# Patient Record
Sex: FEMALE | Race: BLACK OR AFRICAN AMERICAN
[De-identification: names, ages, dates, MRNs, and addresses within clinical notes are randomized per-mention and may not be internally consistent; named-entity substitution may affect disease eponyms.]

---

## 2018-04-15 ENCOUNTER — HOSPITAL ENCOUNTER (OUTPATIENT)
Dept: HOSPITAL 92 - ERS | Age: 28
Setting detail: OBSERVATION
LOS: 1 days | Discharge: HOME | End: 2018-04-16
Attending: SURGERY | Admitting: SURGERY
Payer: SELF-PAY

## 2018-04-15 VITALS — BODY MASS INDEX: 37.8 KG/M2

## 2018-04-15 DIAGNOSIS — F41.9: ICD-10-CM

## 2018-04-15 DIAGNOSIS — F32.9: ICD-10-CM

## 2018-04-15 DIAGNOSIS — K80.64: Primary | ICD-10-CM

## 2018-04-15 DIAGNOSIS — Z91.5: ICD-10-CM

## 2018-04-15 DIAGNOSIS — Z79.899: ICD-10-CM

## 2018-04-15 LAB
ALBUMIN SERPL BCG-MCNC: 4.3 G/DL (ref 3.5–5)
ALP SERPL-CCNC: 131 U/L (ref 40–150)
ALT SERPL W P-5'-P-CCNC: 345 U/L (ref 8–55)
ANION GAP SERPL CALC-SCNC: 14 MMOL/L (ref 10–20)
AST SERPL-CCNC: 496 U/L (ref 5–34)
BACTERIA UR QL AUTO: (no result) HPF
BASOPHILS # BLD AUTO: 0 THOU/UL (ref 0–0.2)
BASOPHILS NFR BLD AUTO: 0.2 % (ref 0–1)
BILIRUB SERPL-MCNC: 2.7 MG/DL (ref 0.2–1.2)
BUN SERPL-MCNC: 8 MG/DL (ref 7–18.7)
CALCIUM SERPL-MCNC: 9.3 MG/DL (ref 7.8–10.44)
CHLORIDE SERPL-SCNC: 105 MMOL/L (ref 98–107)
CO2 SERPL-SCNC: 23 MMOL/L (ref 22–29)
CREAT CL PREDICTED SERPL C-G-VRATE: 0 ML/MIN (ref 70–130)
CRYSTAL-AUWI FLAG: 0.1 (ref 0–15)
EOSINOPHIL # BLD AUTO: 0.2 THOU/UL (ref 0–0.7)
EOSINOPHIL NFR BLD AUTO: 1.8 % (ref 0–10)
GLOBULIN SER CALC-MCNC: 3.2 G/DL (ref 2.4–3.5)
GLUCOSE SERPL-MCNC: 113 MG/DL (ref 70–105)
HEV IGM SER QL: 8.1 (ref 0–7.99)
HGB BLD-MCNC: 14.3 G/DL (ref 12–16)
HYALINE CASTS #/AREA URNS LPF: (no result) LPF
LIPASE SERPL-CCNC: 26 U/L (ref 8–78)
LYMPHOCYTES # BLD: 1.4 THOU/UL (ref 1.2–3.4)
LYMPHOCYTES NFR BLD AUTO: 14.8 % (ref 21–51)
MCH RBC QN AUTO: 30.5 PG (ref 27–31)
MCV RBC AUTO: 91.7 FL (ref 81–99)
MONOCYTES # BLD AUTO: 0.7 THOU/UL (ref 0.11–0.59)
MONOCYTES NFR BLD AUTO: 6.7 % (ref 0–10)
NEUTROPHILS # BLD AUTO: 7.4 THOU/UL (ref 1.4–6.5)
NEUTROPHILS NFR BLD AUTO: 76.5 % (ref 42–75)
PATHC CAST-AUWI FLAG: 1.01 (ref 0–2.49)
PLATELET # BLD AUTO: 264 THOU/UL (ref 130–400)
POTASSIUM SERPL-SCNC: 3.6 MMOL/L (ref 3.5–5.1)
PREGS CONTROL BACKGROUND?: (no result)
PREGS CONTROL BAR APPEAR?: YES
RBC # BLD AUTO: 4.7 MILL/UL (ref 4.2–5.4)
SODIUM SERPL-SCNC: 138 MMOL/L (ref 136–145)
SP GR UR STRIP: 1.02 (ref 1–1.04)
SPERM-AUWI FLAG: 0 (ref 0–9.9)
WBC # BLD AUTO: 9.7 THOU/UL (ref 4.8–10.8)
YEAST-AUWI FLAG: 0 (ref 0–25)

## 2018-04-15 PROCEDURE — 93005 ELECTROCARDIOGRAM TRACING: CPT

## 2018-04-15 PROCEDURE — 76705 ECHO EXAM OF ABDOMEN: CPT

## 2018-04-15 PROCEDURE — 81003 URINALYSIS AUTO W/O SCOPE: CPT

## 2018-04-15 PROCEDURE — 80053 COMPREHEN METABOLIC PANEL: CPT

## 2018-04-15 PROCEDURE — BF101ZZ FLUOROSCOPY OF BILE DUCTS USING LOW OSMOLAR CONTRAST: ICD-10-PCS | Performed by: SURGERY

## 2018-04-15 PROCEDURE — 36415 COLL VENOUS BLD VENIPUNCTURE: CPT

## 2018-04-15 PROCEDURE — 0FT44ZZ RESECTION OF GALLBLADDER, PERCUTANEOUS ENDOSCOPIC APPROACH: ICD-10-PCS | Performed by: SURGERY

## 2018-04-15 PROCEDURE — 47532 INJECTION FOR CHOLANGIOGRAM: CPT

## 2018-04-15 PROCEDURE — 88304 TISSUE EXAM BY PATHOLOGIST: CPT

## 2018-04-15 PROCEDURE — 83690 ASSAY OF LIPASE: CPT

## 2018-04-15 PROCEDURE — 84703 CHORIONIC GONADOTROPIN ASSAY: CPT

## 2018-04-15 PROCEDURE — 81015 MICROSCOPIC EXAM OF URINE: CPT

## 2018-04-15 PROCEDURE — 85025 COMPLETE CBC W/AUTO DIFF WBC: CPT

## 2018-04-15 PROCEDURE — 74330 X-RAY BILE/PANC ENDOSCOPY: CPT

## 2018-04-15 PROCEDURE — G0378 HOSPITAL OBSERVATION PER HR: HCPCS

## 2018-04-15 RX ADMIN — HYDROCODONE BITARTRATE AND ACETAMINOPHEN PRN TAB: 5; 325 TABLET ORAL at 22:58

## 2018-04-15 NOTE — ULT
RIGHT UPPER QUADRANT PAIN:

 

HISTORY: 

Nausea.

 

DATE: 4/15/18.

 

FINDINGS: 

Multiple longitudinal and transverse images of the right upper quadrant of the abdomen are obtained u
sing a multihertz curvilinear transducer.  Real-time and color images are used to evaluate the right 
upper quadrant. 

 

The liver is unremarkable.  The abdominal aorta and inferior vena cava are unremarkable.

 

The gallbladder contains numerous echogenic and shadowing foci compatible with numerous gallstones.  
There may also be a possible gallbladder polyp present.  No evidence of gallbladder wall thickening s
een.

 

The common bile duct is dilated measuring up to 9 mm.  This may represent a distal common bile duct c
alculus.  

 

Visualized portion of the pancreas is unremarkable.  The right kidney is unremarkable.

 

IMPRESSION: 

Cholelithiasis with possible choledocholithiasis.  No significant evidence of ascites is seen.

 

POS: [facility]

## 2018-04-15 NOTE — HP
CHIEF COMPLAINT:  Abdominal pain.

 

HISTORY OF PRESENT ILLNESS:  Ms. Landis is a 27-year-old woman, who presented to the emergency room wi
 unrelenting epigastric pain radiating to her back.  She had a previous episode in January, but thi
s resolved on its own.  The current episode woke her up at 3:00 a.m. and has been persistent since 
at time.  She has had nausea, but no vomiting.  No jaundice or icterus.  The pain is made worse by mo
ving around.  Evaluation in the Emergency Room was positive for elevated LFTs and a dilated common bi
le duct with gallstones in the gallbladder.  The patient has received pain medication in the Emergenc
y Room and is now feeling better.

 

PAST MEDICAL HISTORY:  None.

 

PAST SURGICAL HISTORY:  None.

 

PSYCHIATRIC HISTORY:  History of depression and anxiety with a suicidal gesture in .

 

SOCIAL HISTORY:  She does not smoke, drink or use illicit drugs.

 

FAMILY HISTORY:  Diabetes and hypertension.  Her aunt had heart problems and  after gallbladder s
urgery as a result of her heart disease.

 

ALLERGIES:  The patient has no known drug allergies.

 

OUTPATIENT MEDICATIONS:  Include Prozac daily.

 

REVIEW OF SYSTEMS:  Ten system review of systems is negative except per HPI.

 

PHYSICAL EXAMINATION:

VITAL SIGNS:  Temperature 98.4, heart rate 100, blood pressure 118/68, respirations 20, 97% saturated
 on room air.

GENERAL:  Reveals a slightly anxious appearing young woman, in no acute distress.  She is not flushed
 or toxic in appearance.  She is not jaundiced or icteric.

HEENT:  Unremarkable.

NECK:  Supple, without lymphadenopathy or thyroid nodules.

HEART:  Regular in its rate and rhythm without murmurs, rubs or gallops.

LUNGS:  Clear to auscultation bilaterally with good air entry.  She does not have any pain with deep 
inspiration.

ABDOMEN:  Soft, nontender and nondistended.  She states that previously she was tender to palpation i
n the epigastrium and right upper quadrant, but that has gone away.

EXTREMITIES:  Warm and well perfused without edema.

NEUROLOGIC:  No focal deficits.

PSYCHIATRIC:  Alert, oriented, and appropriate.

 

LABORATORY AND X-RAY FINDINGS:  White count is normal.  Serum pregnancy is negative.  Electrolytes ar
e unremarkable, but bilirubin is elevated at 2.7 and AST and ALT are elevated at 496 and 345.  Ultras
ound images are reviewed and I agree with the written report.  She has stones in her gallbladder, but
 no wall thickening, sonographic Renteria sign or pericholecystic fluid to suggest acute cholecystitis.
  Her bile duct is dilated at 9 mm.

 

ASSESSMENT AND PLAN:  Cholelithiasis and possible choledocholithiasis.  The patient's pain has resolv
ed and she may have passed her stone.  I have discussed her case with Dr. Maya and he recommends lapa
roscopic cholecystectomy first with cholangiogram and is available for immediate endoscopic retrograd
e cholangiopancreatography if necessary.  I discussed the diagnosis and treatment plan with the patie
nt and her family.  I explained the procedures of laparoscopic cholecystectomy with cholangiogram and
 also of endoscopic retrograde cholangiopancreatography and their inherent risks.  Risks of gallbladd
er surgery include but are not limited to bleeding; infection; risks of anesthesia; damage to nearby 
structures including bowel, liver and bile duct; need for open surgery; need for further procedures. 
 Risk of endoscopic retrograde cholangiopancreatography include bleeding, infection, perforation, nee
d further procedures and pancreatitis.  The patient and her family understand and accept these risks 
and wish to proceed.  All of their questions answered.  Antibiotics will be ordered on call to the OR
.

## 2018-04-15 NOTE — RAD
CHOLANGIOGRAM IN SURGERY:

 

Comparison: Gallbladder ultrasound 4-15-18. 

 

History: Cholelithiasis. 

 

FINDINGS/IMPRESSION: 

Two limited intraoperative fluoroscopic views from a cholangiogram in surgery were performed. Contras
t was seen in the cystic duct and common bile duct. There is enlargement of the common bile duct with
 central intrahepatic biliary dilatation. There is no definite spillage of contrast into the duodenum
 and a stone in the distal common bile duct cannot be excluded. No obvious filling defect is seen wit
hin the opacified portions of the common bile duct. 

 

POS: [facility]

## 2018-04-16 VITALS — DIASTOLIC BLOOD PRESSURE: 65 MMHG | SYSTOLIC BLOOD PRESSURE: 95 MMHG | TEMPERATURE: 98.2 F

## 2018-04-16 LAB
ALBUMIN SERPL BCG-MCNC: 3.9 G/DL (ref 3.5–5)
ALP SERPL-CCNC: 150 U/L (ref 40–150)
ALT SERPL W P-5'-P-CCNC: 317 U/L (ref 8–55)
ANION GAP SERPL CALC-SCNC: 11 MMOL/L (ref 10–20)
AST SERPL-CCNC: 231 U/L (ref 5–34)
BASOPHILS # BLD AUTO: 0 THOU/UL (ref 0–0.2)
BASOPHILS NFR BLD AUTO: 0 % (ref 0–1)
BILIRUB SERPL-MCNC: 0.9 MG/DL (ref 0.2–1.2)
BUN SERPL-MCNC: 5 MG/DL (ref 7–18.7)
CALCIUM SERPL-MCNC: 8.7 MG/DL (ref 7.8–10.44)
CHLORIDE SERPL-SCNC: 106 MMOL/L (ref 98–107)
CO2 SERPL-SCNC: 27 MMOL/L (ref 22–29)
CREAT CL PREDICTED SERPL C-G-VRATE: 172 ML/MIN (ref 70–130)
EOSINOPHIL # BLD AUTO: 0 THOU/UL (ref 0–0.7)
EOSINOPHIL NFR BLD AUTO: 0.1 % (ref 0–10)
GLOBULIN SER CALC-MCNC: 2.9 G/DL (ref 2.4–3.5)
GLUCOSE SERPL-MCNC: 148 MG/DL (ref 70–105)
HGB BLD-MCNC: 13.4 G/DL (ref 12–16)
LIPASE SERPL-CCNC: 13 U/L (ref 8–78)
LYMPHOCYTES # BLD: 0.9 THOU/UL (ref 1.2–3.4)
LYMPHOCYTES NFR BLD AUTO: 8.2 % (ref 21–51)
MCH RBC QN AUTO: 30.8 PG (ref 27–31)
MCV RBC AUTO: 92.3 FL (ref 81–99)
MONOCYTES # BLD AUTO: 0.5 THOU/UL (ref 0.11–0.59)
MONOCYTES NFR BLD AUTO: 4.8 % (ref 0–10)
NEUTROPHILS # BLD AUTO: 9.4 THOU/UL (ref 1.4–6.5)
NEUTROPHILS NFR BLD AUTO: 86.9 % (ref 42–75)
PLATELET # BLD AUTO: 280 THOU/UL (ref 130–400)
POTASSIUM SERPL-SCNC: 5 MMOL/L (ref 3.5–5.1)
RBC # BLD AUTO: 4.37 MILL/UL (ref 4.2–5.4)
SODIUM SERPL-SCNC: 139 MMOL/L (ref 136–145)
WBC # BLD AUTO: 10.9 THOU/UL (ref 4.8–10.8)

## 2018-04-16 RX ADMIN — HYDROCODONE BITARTRATE AND ACETAMINOPHEN PRN TAB: 5; 325 TABLET ORAL at 12:46

## 2018-04-16 RX ADMIN — HYDROCODONE BITARTRATE AND ACETAMINOPHEN PRN TAB: 5; 325 TABLET ORAL at 07:31

## 2018-04-18 NOTE — PDOC.OP
Operative Note





- Operative Note


Operative Note: 





PROCEDURE: Laparoscopic cholecystectomy with intraoperative cholangiogram





SURGEON: Arsen Weeks M.D.





DATE OF PROCEDURE:4/15/2018





PREOPERATIVE DIAGNOSIS: Cholelithiasis and cholecystitis, possible 

choledocholithiasis:





POSTOPERATIVE DIAGNOSIS: Cholelithiasis and cholecystitis





HISTORY: Patient with epigastric pain radiating to the back found to have 

elevated LFTs and stones on gallbladder ultrasound. Her bile duct was dilated. 

Her pain resolved after pain medication. After discussion with gastroenterology

, recommendation was made to proceed with laparoscopic cholecystectomy with 

intraoperative cholangiogram, with plans for immediate postoperative ERCP if 

the cholangiogram is abnormal.





FINDINGS: White walled hydropic gallbladder consistent with acute 

cholecystitis. 2 stones impacted in the cystic duct and one stone in the distal 

common bile duct.





PROCEDURE IN DETAIL:  After informed consent was obtained and appropriate 

preoperative antibiotics were administered, the patient was taken to the 

operating room and placed in the supine position and general endotracheal 

anesthesia was administered.  The stomach was decompressed with an OG tube and 

the abdomen was prepped and draped in standard sterile fashion.  Local 

anesthesia was infused to the skin and subcutaneous tissues at the umbilical 

level.  A transverse skin incision was made.  The fascia was elevated and a 

Veress needle was placed into the abdominal cavity without difficulty.  Opening 

pressure was less than 5 and carbon dioxide gas easily insufflated to an intra-

abdominal pressure of 15, which the patient tolerated well.  The Veress needle 

was withdrawn and a [place] port advanced under direct vision. The abdominal 

cavity was carefully examined.  There was no evidence of Veress needle or of 

trocar injury.  Local anesthesia was infused to the skin and subcutaneous 

tissues at the epigastric, right upper quadrant, and right lateral abdominal 

sites and trocars were placed under direct vision of the laparoscope.  The 

fundus of the gallbladder was too taut to grasp the gallbladder was aspirated 

with removal of about 20 mL's of colorless hydropic bile. The fundus was then 

able to be grasped and retracted superiorly. The infundibulum was grasped and 

retracted laterally.  The serosa was stripped inferiorly at the level of the 

neck of the gallbladder exposing the cystic duct and artery which were traced 

clearly to their insertion in the gallbladder. These were somewhat obscured by 

a large lymph node of [place]ot so this was resected and passed from the field. The 

cystic duct was then dissected free circumferentially and the cystic duct was 

clipped at the level of the neck of the gallbladder. The cystic artery was 

clipped but not divided. An incision was made in the cystic duct inferior to 

the clip and the cystic duct was palpated with 2 stones extruded. There was 

then flow of bile from the cystic duct with no other stones palpable.. A 

cholangiogram catheter was introduced and placed into the cystic duct and 

secured with a clamp. A cholangiogram was obtained which showed an adequate 

length of cystic duct. There was normal filling of the common bile duct with no 

flow of contrast into the duodenum and a meniscus sign in the distal common 

bile duct. Glucagon was administered and allowed to circulate for 3 minutes 

following which the duct was flushed and a second cholangiogram obtained with 

the same result.  There was normal retrograde flow into the common hepatic duct 

beyond the level of the bifurcation without filling defects. The cholangiogram 

catheter was removed and the cystic duct clipped below the incision in the 

cystic duct. The cystic duct was divided between these clips and the previously 

placed clip. The cystic artery was divided between the previously placed clips.

  The gallbladder was then dissected free of the gallbladder bed using hook 

electrocautery.  Prior to complete removal of the gallbladder from the 

gallbladder bed, the area of the cystic duct and artery stumps was examined.  

The clips were in good position completely across these structures and there 

was no bleeding and no leakage of bile. The gallbladder was then placed into an 

EndoCatch bag and drawn out through the epigastric incision.  The epigastric 

trocar was replaced and the operative site easily irrigated to clear. There was 

no significant bleeding or spillage of bile. The epigastric trocar was removed 

and the fascia closed under direct laparoscopic vision with a 0 Vicryl suture 

on a GraNee needle in a figure-of-eight manner with excellent technical result.

  The right upper quadrant and right lateral abdominal trocars were removed and 

hemostasis verified.  Carbon dioxide gas was allowed to desufflate through the 

umbilical trocar which was then removed. The skin incisions were closed with 4-

0 subcuticular Monocryl sutures and Dermabond  dressings were placed.   The 

patient was extubated and taken to the endoscopy suite for immediate ERCP in 

good condition.  There were no complications.  





ESTIMATED BLOOD LOSS: Minimal.  





SPECIMEN : Gallbladder and contents.

## 2019-02-04 ENCOUNTER — HOSPITAL ENCOUNTER (EMERGENCY)
Dept: HOSPITAL 92 - ERS | Age: 29
Discharge: HOME | End: 2019-02-04
Payer: SELF-PAY

## 2019-02-04 DIAGNOSIS — Z79.899: ICD-10-CM

## 2019-02-04 DIAGNOSIS — G43.909: ICD-10-CM

## 2019-02-04 DIAGNOSIS — N39.0: Primary | ICD-10-CM

## 2019-02-04 DIAGNOSIS — F41.9: ICD-10-CM

## 2019-02-04 DIAGNOSIS — F32.9: ICD-10-CM

## 2019-02-04 LAB
ALBUMIN SERPL BCG-MCNC: 4.1 G/DL (ref 3.5–5)
ALP SERPL-CCNC: 86 U/L (ref 40–150)
ALT SERPL W P-5'-P-CCNC: 17 U/L (ref 8–55)
ANION GAP SERPL CALC-SCNC: 14 MMOL/L (ref 10–20)
AST SERPL-CCNC: 15 U/L (ref 5–34)
BACTERIA UR QL AUTO: (no result) HPF
BASOPHILS # BLD AUTO: 0.1 THOU/UL (ref 0–0.2)
BASOPHILS NFR BLD AUTO: 0.7 % (ref 0–1)
BILIRUB SERPL-MCNC: 0.2 MG/DL (ref 0.2–1.2)
BUN SERPL-MCNC: 7 MG/DL (ref 7–18.7)
CALCIUM SERPL-MCNC: 9.1 MG/DL (ref 7.8–10.44)
CHLORIDE SERPL-SCNC: 108 MMOL/L (ref 98–107)
CO2 SERPL-SCNC: 22 MMOL/L (ref 22–29)
CREAT CL PREDICTED SERPL C-G-VRATE: 0 ML/MIN (ref 70–130)
CRYSTAL-AUWI FLAG: 0 (ref 0–15)
EOSINOPHIL # BLD AUTO: 0.2 THOU/UL (ref 0–0.7)
EOSINOPHIL NFR BLD AUTO: 2.5 % (ref 0–10)
GLOBULIN SER CALC-MCNC: 3.3 G/DL (ref 2.4–3.5)
GLUCOSE SERPL-MCNC: 153 MG/DL (ref 70–105)
HEV IGM SER QL: 7.2 (ref 0–7.99)
HGB BLD-MCNC: 14.3 G/DL (ref 12–16)
HYALINE CASTS #/AREA URNS LPF: (no result) LPF
LYMPHOCYTES # BLD: 2.5 THOU/UL (ref 1.2–3.4)
LYMPHOCYTES NFR BLD AUTO: 25.4 % (ref 21–51)
MCH RBC QN AUTO: 30.8 PG (ref 27–31)
MCV RBC AUTO: 92.4 FL (ref 78–98)
MONOCYTES # BLD AUTO: 0.5 THOU/UL (ref 0.11–0.59)
MONOCYTES NFR BLD AUTO: 5 % (ref 0–10)
NEUTROPHILS # BLD AUTO: 6.4 THOU/UL (ref 1.4–6.5)
NEUTROPHILS NFR BLD AUTO: 66.5 % (ref 42–75)
PATHC CAST-AUWI FLAG: 0.14 (ref 0–2.49)
PLATELET # BLD AUTO: 257 THOU/UL (ref 130–400)
POTASSIUM SERPL-SCNC: 3.6 MMOL/L (ref 3.5–5.1)
PREGU CONTROL BACKGROUND?: (no result)
PREGU CONTROL BAR APPEAR?: YES
RBC # BLD AUTO: 4.62 MILL/UL (ref 4.2–5.4)
RBC UR QL AUTO: (no result) HPF (ref 0–3)
SODIUM SERPL-SCNC: 140 MMOL/L (ref 136–145)
SP GR UR STRIP: 1.01 (ref 1–1.04)
SPERM-AUWI FLAG: 0 (ref 0–9.9)
WBC # BLD AUTO: 9.7 THOU/UL (ref 4.8–10.8)
YEAST-AUWI FLAG: 0 (ref 0–25)

## 2019-02-04 PROCEDURE — 85025 COMPLETE CBC W/AUTO DIFF WBC: CPT

## 2019-02-04 PROCEDURE — 36415 COLL VENOUS BLD VENIPUNCTURE: CPT

## 2019-02-04 PROCEDURE — 80053 COMPREHEN METABOLIC PANEL: CPT

## 2019-02-04 PROCEDURE — 87086 URINE CULTURE/COLONY COUNT: CPT

## 2019-02-04 PROCEDURE — 81003 URINALYSIS AUTO W/O SCOPE: CPT

## 2019-02-04 PROCEDURE — 81015 MICROSCOPIC EXAM OF URINE: CPT

## 2019-02-04 PROCEDURE — 93005 ELECTROCARDIOGRAM TRACING: CPT

## 2019-02-04 PROCEDURE — 81025 URINE PREGNANCY TEST: CPT

## 2025-01-19 NOTE — CON
DATE OF CONSULTATION:  04/15/2018

 

HISTORY OF PRESENT ILLNESS:  Patient is a 27-year-old -American female, who underwent a cholec
ystectomy for symptomatic gallstones.  Her liver tests were elevated and she underwent intraoperative
 cholangiogram.  This showed a meniscus type of filling defect in the distal common duct and no contr
ast into the duodenum.

 

PAST MEDICAL HISTORY:  Negative.

 

PAST SURGICAL HISTORY:  Negative.

 

SOCIAL HISTORY:  She does not smoke or drink.

 

FAMILY HISTORY:  Significant for diabetes mellitus.

 

ALLERGIES:  No known allergies.

 

MEDICATIONS:  Include, PROzac.

 

REVIEW OF SYSTEMS:  Unobtainable.

 

PHYSICAL EXAMINATION:

GENERAL:  Shows an intubated, -American female in no acute distress.

HEENT:  Unremarkable except for orotracheal tube.

NECK:  Supple.

CHEST:  Clear.

CARDIOVASCULAR:  Regular rate and rhythm.

ABDOMEN:  Soft, nontender, without organomegaly or masses.  She has 3 small incisions in her abdomen.


RECTAL:  Deferred.

EXTREMITIES:  Normal.

NEUROLOGIC:  Nonfocal.

 

LABORATORY DATA:  Shows a total bilirubin 27, AST of 496, ALT of 345, white blood cell count of 9.7, 
hemoglobin 14.3, hematocrit 43.1.

 

ASSESSMENT:  Choledocholithiasis by intraoperative cholangiogram.

 

RECOMMENDATIONS:  ERCP and stone extraction. Age appropriate